# Patient Record
Sex: MALE | Race: WHITE | NOT HISPANIC OR LATINO | Employment: OTHER | URBAN - NONMETROPOLITAN AREA
[De-identification: names, ages, dates, MRNs, and addresses within clinical notes are randomized per-mention and may not be internally consistent; named-entity substitution may affect disease eponyms.]

---

## 2017-10-23 ENCOUNTER — OFFICE VISIT (OUTPATIENT)
Dept: MEDICAL GROUP | Facility: CLINIC | Age: 47
End: 2017-10-23
Payer: COMMERCIAL

## 2017-10-23 VITALS
RESPIRATION RATE: 16 BRPM | HEIGHT: 73 IN | TEMPERATURE: 99.7 F | HEART RATE: 88 BPM | DIASTOLIC BLOOD PRESSURE: 88 MMHG | SYSTOLIC BLOOD PRESSURE: 138 MMHG | BODY MASS INDEX: 27.67 KG/M2 | OXYGEN SATURATION: 97 % | WEIGHT: 208.8 LBS

## 2017-10-23 DIAGNOSIS — Z00.00 WELL ADULT EXAM: ICD-10-CM

## 2017-10-23 PROCEDURE — 99396 PREV VISIT EST AGE 40-64: CPT | Performed by: FAMILY MEDICINE

## 2017-10-23 ASSESSMENT — ENCOUNTER SYMPTOMS
HEADACHES: 0
MYALGIAS: 0
DIZZINESS: 0
CONSTIPATION: 0
HEMOPTYSIS: 0
MUSCULOSKELETAL NEGATIVE: 1
CONSTITUTIONAL NEGATIVE: 1
RESPIRATORY NEGATIVE: 1
GASTROINTESTINAL NEGATIVE: 1
PSYCHIATRIC NEGATIVE: 1
CARDIOVASCULAR NEGATIVE: 1
NEUROLOGICAL NEGATIVE: 1
PALPITATIONS: 0
FEVER: 0
COUGH: 0
CHILLS: 0
EYES NEGATIVE: 1
NECK PAIN: 0

## 2017-10-23 ASSESSMENT — PATIENT HEALTH QUESTIONNAIRE - PHQ9: CLINICAL INTERPRETATION OF PHQ2 SCORE: 0

## 2017-10-23 NOTE — PROGRESS NOTES
Subjective:      Yinka Laird is a 47 y.o. male who presents with [facility]            New patient visit, no acute problems, some loss of libido recently but nothing else    1. Well adult exam    - COMP METABOLIC PANEL; Future  - LIPID PROFILE; Future  - FREE THYROXINE; Future  - TRIIDOTHYRONINE; Future  - TESTOSTERONE SERUM; Future  - TSH; Future  - VITAMIN D,25 HYDROXY; Future  - CBC WITHOUT DIFFERENTIAL; Future    History reviewed. No pertinent past medical history.  History reviewed. No pertinent surgical history.  Smoking status: Former Smoker                                                              Packs/day: 0.00      Years: 0.00         Quit date: 3/23/2011  Smokeless tobacco: Former User                        Types: Chew  Alcohol use: Yes              Comment: occasionally    Review of patient's family history indicates:    Cancer                         Mother                      No current outpatient prescriptions on file.    Patient was instructed on the use of medications, either prescriptions or OTC and informed on when the appropriate follow up time period should be. In addition, patient was also instructed that should any acute worsening occur that they should notify this clinic asap or call 911.            Review of Systems   Constitutional: Negative.  Negative for chills and fever.        History reviewed. No pertinent past medical history.  History reviewed. No pertinent surgical history.  Smoking status: Former Smoker                                                              Packs/day: 0.00      Years: 0.00         Quit date: 3/23/2011  Smokeless tobacco: Former User                        Types: Chew  Alcohol use: Yes              Comment: occasionally    Review of patient's family history indicates:    Cancer                         Mother                     HENT: Negative.    Eyes: Negative.    Respiratory: Negative.  Negative for cough and hemoptysis.    Cardiovascular:  "Negative.  Negative for chest pain and palpitations.   Gastrointestinal: Negative.  Negative for constipation.   Genitourinary: Negative.  Negative for dysuria and urgency.   Musculoskeletal: Negative.  Negative for myalgias and neck pain.   Skin: Negative.  Negative for rash.   Neurological: Negative.  Negative for dizziness and headaches.   Endo/Heme/Allergies: Negative.    Psychiatric/Behavioral: Negative.  Negative for suicidal ideas.          Objective:     /88   Pulse 88   Temp 37.6 °C (99.7 °F)   Resp 16   Ht 1.854 m (6' 1\")   Wt 94.7 kg (208 lb 12.8 oz)   SpO2 97%   BMI 27.55 kg/m²      Physical Exam   Constitutional: He is oriented to person, place, and time. He appears well-developed and well-nourished. No distress.   HENT:   Head: Normocephalic and atraumatic.   Right Ear: External ear normal.   Left Ear: External ear normal.   Nose: Nose normal.   Mouth/Throat: Oropharynx is clear and moist. No oropharyngeal exudate.   Eyes: Pupils are equal, round, and reactive to light. Right eye exhibits no discharge. Left eye exhibits no discharge. No scleral icterus.   Neck: Normal range of motion. Neck supple. No JVD present. No tracheal deviation present. No thyromegaly present.   Cardiovascular: Normal rate, regular rhythm, normal heart sounds and intact distal pulses.  Exam reveals no gallop and no friction rub.    No murmur heard.  Pulmonary/Chest: Effort normal and breath sounds normal. No stridor. No respiratory distress. He has no wheezes. He has no rales. He exhibits no tenderness.   Abdominal: Soft. He exhibits no distension. There is no tenderness.   Lymphadenopathy:     He has no cervical adenopathy.   Neurological: He is alert and oriented to person, place, and time. No cranial nerve deficit.   Skin: He is not diaphoretic.   Psychiatric: He has a normal mood and affect. His behavior is normal. Judgment and thought content normal.   Nursing note and vitals reviewed.            "   Assessment/Plan:     1. Well adult exam    - COMP METABOLIC PANEL; Future  - LIPID PROFILE; Future  - FREE THYROXINE; Future  - TRIIDOTHYRONINE; Future  - TESTOSTERONE SERUM; Future  - TSH; Future  - VITAMIN D,25 HYDROXY; Future  - CBC WITHOUT DIFFERENTIAL; Future

## 2017-10-26 ENCOUNTER — NON-PROVIDER VISIT (OUTPATIENT)
Dept: MEDICAL GROUP | Facility: CLINIC | Age: 47
End: 2017-10-26
Payer: COMMERCIAL

## 2017-10-26 ENCOUNTER — HOSPITAL ENCOUNTER (OUTPATIENT)
Facility: MEDICAL CENTER | Age: 47
End: 2017-10-26
Attending: FAMILY MEDICINE
Payer: COMMERCIAL

## 2017-10-26 DIAGNOSIS — Z01.89 ROUTINE LAB DRAW: ICD-10-CM

## 2017-10-26 LAB
25(OH)D3 SERPL-MCNC: 21 NG/ML
CHOLEST SERPL-MCNC: 308 MG/DL
CREAT SERPL-MCNC: 0.81 MG/DL
HDLC SERPL-MCNC: 46 MG/DL
LDLC SERPL CALC-MCNC: 199 MG/DL
TRIGL SERPL-MCNC: 371 MG/DL

## 2017-10-26 PROCEDURE — 99000 SPECIMEN HANDLING OFFICE-LAB: CPT | Performed by: PHYSICIAN ASSISTANT

## 2017-10-26 PROCEDURE — 36415 COLL VENOUS BLD VENIPUNCTURE: CPT | Performed by: PHYSICIAN ASSISTANT

## 2017-12-05 ENCOUNTER — TELEPHONE (OUTPATIENT)
Dept: MEDICAL GROUP | Facility: CLINIC | Age: 47
End: 2017-12-05

## 2017-12-05 NOTE — TELEPHONE ENCOUNTER
Pt called and wanted to know his results of his labs. I looked in chart and there is no note stating his results. Please advise

## 2017-12-15 ENCOUNTER — OFFICE VISIT (OUTPATIENT)
Dept: MEDICAL GROUP | Facility: CLINIC | Age: 47
End: 2017-12-15
Payer: COMMERCIAL

## 2017-12-15 VITALS
SYSTOLIC BLOOD PRESSURE: 132 MMHG | DIASTOLIC BLOOD PRESSURE: 84 MMHG | HEIGHT: 73 IN | OXYGEN SATURATION: 98 % | HEART RATE: 76 BPM | TEMPERATURE: 99.5 F | WEIGHT: 208 LBS | BODY MASS INDEX: 27.57 KG/M2

## 2017-12-15 DIAGNOSIS — E78.2 MIXED HYPERLIPIDEMIA: ICD-10-CM

## 2017-12-15 DIAGNOSIS — E55.9 VITAMIN D DEFICIENCY: ICD-10-CM

## 2017-12-15 DIAGNOSIS — E11.9 CONTROLLED TYPE 2 DIABETES MELLITUS WITHOUT COMPLICATION, WITHOUT LONG-TERM CURRENT USE OF INSULIN (HCC): ICD-10-CM

## 2017-12-15 PROCEDURE — 99214 OFFICE O/P EST MOD 30 MIN: CPT | Performed by: FAMILY MEDICINE

## 2017-12-15 ASSESSMENT — ENCOUNTER SYMPTOMS
CHILLS: 0
FEVER: 0
MYALGIAS: 0
COUGH: 0
MUSCULOSKELETAL NEGATIVE: 1
NEUROLOGICAL NEGATIVE: 1
DIZZINESS: 0
EYES NEGATIVE: 1
NECK PAIN: 0
CONSTIPATION: 0
GASTROINTESTINAL NEGATIVE: 1
PSYCHIATRIC NEGATIVE: 1
HEADACHES: 0
CONSTITUTIONAL NEGATIVE: 1
RESPIRATORY NEGATIVE: 1
CARDIOVASCULAR NEGATIVE: 1
HEMOPTYSIS: 0
PALPITATIONS: 0

## 2017-12-15 NOTE — PROGRESS NOTES
Subjective:      Yinka Laird is a 47 y.o. male who presents with Diabetes Mellitus            1. Vitamin D deficiency  Low on labs will replace  - cholecalciferol ([employer] VIT D 5000 HIGH-POTENCY) 5000 UNIT Cap; Take 1 Cap by mouth every day.  Dispense: 30 Cap; Refill: 3  - COMP METABOLIC PANEL; Future  - HEMOGLOBIN A1C; Future  - LIPID PROFILE; Future    2. Controlled type 2 diabetes mellitus without complication, without long-term current use of insulin (CMS-[facility])  nfbs of 299  Likely diabetic  Patient reluctant to take meds wants to do trial of strict diet  Offered him a one month trial of this and then doing another set of labs in 2 mo  Agrees to this and if not optimal in 2  Months will need meds  - cholecalciferol ([employer]S VIT D 5000 HIGH-POTENCY) 5000 UNIT Cap; Take 1 Cap by mouth every day.  Dispense: 30 Cap; Refill: 3  - COMP METABOLIC PANEL; Future  - HEMOGLOBIN A1C; Future  - LIPID PROFILE; Future    3. Mixed hyperlipidemia  ldl of 199    Patient reluctant to take meds wants to do trial of strict diet  Offered him a one month trial of this and then doing another set of labs in 2 mo  Agrees to this and if not optimal in 2  Months will need meds    - cholecalciferol (CVS VIT D 5000 HIGH-POTENCY) 5000 UNIT Cap; Take 1 Cap by mouth every day.  Dispense: 30 Cap; Refill: 3  - COMP METABOLIC PANEL; Future  - HEMOGLOBIN A1C; Future  - LIPID PROFILE; Future    Past Medical History:  12/15/2017: Mixed hyperlipidemia  No past surgical history on file.  Smoking status: Former Smoker                                                              Packs/day: 0.00      Years: 0.00         Quit date: 3/23/2011  Smokeless tobacco: Former User                        Types: Chew  Alcohol use: Yes              Comment: occasionally    Review of patient's family history indicates:    Cancer                         Mother                      Current Outpatient Prescriptions: •  cholecalciferol (CVS VIT D 5000 HIGH-POTENCY) 5000 UNIT  "Cap, Take 1 Cap by mouth every day., Disp: 30 Cap, Rfl: 3    Patient was instructed on the use of medications, either prescriptions or OTC and informed on when the appropriate follow up time period should be. In addition, patient was also instructed that should any acute worsening occur that they should notify this clinic asap or call 911.            Review of Systems   Constitutional: Negative.  Negative for chills and fever.        Past Medical History:  12/15/2017: Mixed hyperlipidemia  No past surgical history on file.  Smoking status: Former Smoker                                                              Packs/day: 0.00      Years: 0.00         Quit date: 3/23/2011  Smokeless tobacco: Former User                        Types: Chew  Alcohol use: Yes              Comment: occasionally    Review of patient's family history indicates:    Cancer                         Mother                     HENT: Negative.    Eyes: Negative.    Respiratory: Negative.  Negative for cough and hemoptysis.    Cardiovascular: Negative.  Negative for chest pain and palpitations.   Gastrointestinal: Negative.  Negative for constipation.   Genitourinary: Negative.  Negative for dysuria and urgency.   Musculoskeletal: Negative.  Negative for myalgias and neck pain.   Skin: Negative.  Negative for rash.   Neurological: Negative.  Negative for dizziness and headaches.   Endo/Heme/Allergies: Negative.    Psychiatric/Behavioral: Negative.  Negative for suicidal ideas.          Objective:     /84   Pulse 76   Temp 37.5 °C (99.5 °F)   Ht 1.854 m (6' 1\")   Wt 94.3 kg (208 lb)   SpO2 98%   BMI 27.44 kg/m²      Physical Exam   Constitutional: He is oriented to person, place, and time. He appears well-developed and well-nourished. No distress.   HENT:   Head: Normocephalic and atraumatic.   Mouth/Throat: Oropharynx is clear and moist. No oropharyngeal exudate.   Eyes: Pupils are equal, round, and reactive to light. "   Cardiovascular: Normal rate, regular rhythm, normal heart sounds and intact distal pulses.  Exam reveals no gallop and no friction rub.    No murmur heard.  Pulmonary/Chest: Effort normal and breath sounds normal. No respiratory distress. He has no wheezes. He has no rales. He exhibits no tenderness.   Neurological: He is alert and oriented to person, place, and time.   Skin: He is not diaphoretic.   Psychiatric: He has a normal mood and affect. His behavior is normal. Judgment and thought content normal.   Nursing note and vitals reviewed.              Assessment/Plan:     1. Vitamin D deficiency    - cholecalciferol (CVS VIT D 5000 HIGH-POTENCY) 5000 UNIT Cap; Take 1 Cap by mouth every day.  Dispense: 30 Cap; Refill: 3  - COMP METABOLIC PANEL; Future  - HEMOGLOBIN A1C; Future  - LIPID PROFILE; Future    2. Controlled type 2 diabetes mellitus without complication, without long-term current use of insulin (CMS-HCC)    - cholecalciferol (CVS VIT D 5000 HIGH-POTENCY) 5000 UNIT Cap; Take 1 Cap by mouth every day.  Dispense: 30 Cap; Refill: 3  - COMP METABOLIC PANEL; Future  - HEMOGLOBIN A1C; Future  - LIPID PROFILE; Future    3. Mixed hyperlipidemia    - cholecalciferol (CVS VIT D 5000 HIGH-POTENCY) 5000 UNIT Cap; Take 1 Cap by mouth every day.  Dispense: 30 Cap; Refill: 3  - COMP METABOLIC PANEL; Future  - HEMOGLOBIN A1C; Future  - LIPID PROFILE; Future

## 2018-02-28 ENCOUNTER — NON-PROVIDER VISIT (OUTPATIENT)
Dept: MEDICAL GROUP | Facility: CLINIC | Age: 48
End: 2018-02-28
Payer: COMMERCIAL

## 2018-02-28 DIAGNOSIS — Z01.89 ROUTINE LAB DRAW: ICD-10-CM

## 2018-02-28 LAB — HBA1C MFR BLD: 7.4 % (ref ?–5.8)

## 2018-02-28 PROCEDURE — 36415 COLL VENOUS BLD VENIPUNCTURE: CPT | Performed by: PHYSICIAN ASSISTANT

## 2018-02-28 PROCEDURE — 99000 SPECIMEN HANDLING OFFICE-LAB: CPT | Performed by: PHYSICIAN ASSISTANT

## 2018-03-08 ENCOUNTER — OFFICE VISIT (OUTPATIENT)
Dept: MEDICAL GROUP | Facility: CLINIC | Age: 48
End: 2018-03-08
Payer: COMMERCIAL

## 2018-03-08 VITALS
DIASTOLIC BLOOD PRESSURE: 76 MMHG | BODY MASS INDEX: 26.64 KG/M2 | RESPIRATION RATE: 16 BRPM | OXYGEN SATURATION: 97 % | HEART RATE: 87 BPM | SYSTOLIC BLOOD PRESSURE: 124 MMHG | HEIGHT: 73 IN | TEMPERATURE: 99.5 F | WEIGHT: 201 LBS

## 2018-03-08 DIAGNOSIS — E78.2 MIXED HYPERLIPIDEMIA: ICD-10-CM

## 2018-03-08 DIAGNOSIS — Z13.6 SCREENING FOR CARDIOVASCULAR CONDITION: ICD-10-CM

## 2018-03-08 DIAGNOSIS — M79.674 TOE PAIN, RIGHT: ICD-10-CM

## 2018-03-08 DIAGNOSIS — E11.9 CONTROLLED TYPE 2 DIABETES MELLITUS WITHOUT COMPLICATION, WITHOUT LONG-TERM CURRENT USE OF INSULIN (HCC): ICD-10-CM

## 2018-03-08 PROCEDURE — 99214 OFFICE O/P EST MOD 30 MIN: CPT | Performed by: PHYSICIAN ASSISTANT

## 2018-03-08 RX ORDER — LISINOPRIL 2.5 MG/1
2.5 TABLET ORAL DAILY
Qty: 30 TAB | Refills: 1 | Status: SHIPPED | OUTPATIENT
Start: 2018-03-08 | End: 2018-06-01 | Stop reason: SDUPTHER

## 2018-03-08 RX ORDER — PRAVASTATIN SODIUM 20 MG
20 TABLET ORAL
Qty: 30 TAB | Refills: 11 | Status: SHIPPED | OUTPATIENT
Start: 2018-03-08 | End: 2018-06-07 | Stop reason: SDUPTHER

## 2018-03-08 NOTE — ASSESSMENT & PLAN NOTE
Last A1c: 7.4% Early March, 2018   DM Medications: none at this time.   HTN: Blood pressure goal <140/<90 Yes  ACE: none. Will start before next visit.   Hyperlipidemia: Cholesterol goal LDL <100 No.   Currently Rx Statin: will start pravastatin. Diabetic diet: No, referred to nutrition therapy.   Exercise: occasional  Last monofilament foot exam: none will complete at next visit.  Checks feet at home: No, no sores currently   Last Eye exam: none recently, will check at next visit.   Kidney function: GFR > 60 march 2018  Microalbumin screening: wnl march 2018  Has patient received flu vaccine: No  Has patient received Hep B series:No    A1c goal <7 No  Current barriers to control include medication   Glucose monitoring frequency: none at this time.  Diabetic complications: none    The patient is not taking ASA every day and will begin taking all other medications as prescribed.

## 2018-03-08 NOTE — ASSESSMENT & PLAN NOTE
For many years, the patient states he has had a painful lump on the bottom of his right foot underneath his fourth toe. He states that he was told once that it was a tendon problem but nothing was ever done about it. He states that when he wears narrow shoes it becomes very bothersome. He denies radiating symptoms, swelling or redness.

## 2018-03-08 NOTE — ASSESSMENT & PLAN NOTE
This problem was recently discovered. Patient attempted to control this with diet alone and was teresa to reduce his triglycerides from 371 in Oct 2017 to 159 in March 2018. His LDL has reduced from 199 to 183 in this same time. HDL has remained essentially the same.   The patient denies chest pain, shortness of breath or blurry vision at this time.   Labs in media.

## 2018-03-08 NOTE — PROGRESS NOTES
Chief Complaint   Patient presents with   • Hyperlipidemia   • Elevated Glucose       HISTORY OF PRESENT ILLNESS: Patient is a 47 y.o. male established patient who presents today for evaluation and management of:    Controlled type 2 diabetes mellitus without complication, without long-term current use of insulin (CMS-[facility])  Last A1c: 7.4% Early March, 2018   DM Medications: none at this time.   HTN: Blood pressure goal <140/<90 Yes  ACE: none. Will start before next visit.   Hyperlipidemia: Cholesterol goal LDL <100 No.   Currently Rx Statin: will start pravastatin. Diabetic diet: No, referred to nutrition therapy.   Exercise: occasional  Last monofilament foot exam: none will complete at next visit.  Checks feet at home: No, no sores currently   Last Eye exam: none recently, will check at next visit.   Kidney function: GFR > 60 march 2018  Microalbumin screening: wnl march 2018  Has patient received flu vaccine: No  Has patient received Hep B series:No    A1c goal <7 No  Current barriers to control include medication   Glucose monitoring frequency: none at this time.  Diabetic complications: none    The patient is not taking ASA every day and will begin taking all other medications as prescribed.      Mixed hyperlipidemia  This problem was recently discovered. Patient attempted to control this with diet alone and was teresa to reduce his triglycerides from 371 in Oct 2017 to 159 in March 2018. His LDL has reduced from 199 to 183 in this same time. HDL has remained essentially the same.   The patient denies chest pain, shortness of breath or blurry vision at this time.   Labs in media.       Toe pain, right  For many years, the patient states he has had a painful lump on the bottom of his right foot underneath his fourth toe. He states that he was told once that it was a tendon problem but nothing was ever done about it. He states that when he wears narrow shoes it becomes very bothersome. He denies radiating  symptoms, swelling or redness.        Patient Active Problem List    Diagnosis Date Noted   • Toe pain, right 2018   • Vitamin D deficiency 12/15/2017   • Controlled type 2 diabetes mellitus without complication, without long-term current use of insulin (CMS-[facility]) 12/15/2017   • Mixed hyperlipidemia 12/15/2017       Allergies:Patient has no known allergies.    Current Outpatient Prescriptions   Medication Sig Dispense Refill   • metFORMIN (GLUCOPHAGE) 500 MG Tab Take 1 Tab by mouth every morning with breakfast. 30 Tab 2   • pravastatin (PRAVACHOL) 20 MG Tab Take 1 Tab by mouth every bedtime. 30 Tab 11   • lisinopril (PRINIVIL) 2.5 MG Tab Take 1 Tab by mouth every day. 30 Tab 1   • cholecalciferol ([employer]S VIT D 5000 HIGH-POTENCY) 5000 UNIT Cap Take 1 Cap by mouth every day. 30 Cap 3     No current facility-administered medications for this visit.        Social History   Substance Use Topics   • Smoking status: Former Smoker     Types: Cigarettes     Quit date: 3/23/2011   • Smokeless tobacco: Former User     Types: Chew   • Alcohol use Yes      Comment: occasionally       Family Status   Relation Status   • Mother    • Father Alive     Family History   Problem Relation Age of Onset   • Breast Cancer Mother        Review of Systems:   Constitutional: Negative for fever, chills, weight loss and malaise/fatigue.   HENT: Negative for ear pain, nosebleeds, congestion, sore throat and neck pain.    Eyes: Negative for blurred vision.   Respiratory: Negative for cough, sputum production, shortness of breath and wheezing.    Cardiovascular: Negative for chest pain, palpitations, orthopnea and leg swelling.   Gastrointestinal: Negative for heartburn, nausea, vomiting and abdominal pain.   Genitourinary: Negative for dysuria, urgency and frequency.   Musculoskeletal: Negative for myalgias, back pain.   Skin: Negative for rash and itching.   Neurological: Negative for dizziness, tingling, tremors, sensory change, focal  "weakness and headaches.   Endo/Heme/Allergies: Does not bruise/bleed easily.   Psychiatric/Behavioral: Negative for depression, suicidal ideas and memory loss.  The patient is not nervous/anxious and does not have insomnia.      Exam:  Blood pressure 124/76, pulse 87, temperature 37.5 °C (99.5 °F), resp. rate 16, height 1.854 m (6' 1\"), weight 91.2 kg (201 lb), SpO2 97 %.  Body mass index is 26.52 kg/m².  General:  Healthy-Appearing male in NAD  Head: is grossly normal.  Neck: Supple without masses. Thyroid is not visibly enlarged.  Pulmonary: Clear to ausculation. Normal effort. No rales, ronchi, or wheezing.  Cardiovascular: Regular rate and rhythm without murmur. Carotid and radial pulses are intact and equal bilaterally.  Extremities: no clubbing, cyanosis, or edema.  Musculoskeletal: tenderness to deep palpation of the dorsal side of the mtp joint of the right 4th toe. No erythema or edema present at this location. Gait is wnl. ROM wnl for all joints including the involved toe.     Medical decision-making and discussion:  1. Controlled type 2 diabetes mellitus without complication, without long-term current use of insulin (CMS-HCC)  Over 30 minutes was spent in discussion of this patient's new diabetes diagnosis. Diet modifications, diabetes.org, medications, and pathology were discussed.   - metFORMIN (GLUCOPHAGE) 500 MG Tab; Take 1 Tab by mouth every morning with breakfast.  Dispense: 30 Tab; Refill: 2  Start the following medication about 4 weeks after starting metformin:  - lisinopril (PRINIVIL) 2.5 MG Tab; Take 1 Tab by mouth every day.  Dispense: 30 Tab; Refill: 1  - HEMOGLOBIN A1C  - CMP (12)  - REFERRAL TO NUTRITION SERVICES    2. Mixed hyperlipidemia  Start the following medication 2 weeks after starting metformin.   - pravastatin (PRAVACHOL) 20 MG Tab; Take 1 Tab by mouth every bedtime.  Dispense: 30 Tab; Refill: 11    3. Screening for cardiovascular condition  - LIPID PANEL    4. Toe pain, right  - " DX-FOOT-2- RIGHT; Future      Please note that this dictation was created using voice recognition software. I have made every reasonable attempt to correct obvious errors, but I expect that there are errors of grammar and possibly content that I did not discover before finalizing the note.      Return in about 6 weeks (around 4/19/2018) for DM, HLD.

## 2018-04-17 ENCOUNTER — NON-PROVIDER VISIT (OUTPATIENT)
Dept: HEALTH INFORMATION MANAGEMENT | Facility: MEDICAL CENTER | Age: 48
End: 2018-04-17
Payer: COMMERCIAL

## 2018-04-17 DIAGNOSIS — E11.9 CONTROLLED TYPE 2 DIABETES MELLITUS WITHOUT COMPLICATION, WITHOUT LONG-TERM CURRENT USE OF INSULIN (HCC): ICD-10-CM

## 2018-04-17 PROCEDURE — 98961 EDU&TRN PT SLF-MGMT NQHP 2-4: CPT | Performed by: INTERNAL MEDICINE

## 2018-04-17 NOTE — NON-PROVIDER
Yinka presents for Type II Diabetes class with new onset diabetes, HbA1c= 7.4%.  He started metformin 500 mg once daily without side effects.  Discussed basic physiology of diabetes, nutrition, exercise. oral medications, brief discussion of insulin, illness/injury, hormones, FSBG testing, preventing and treating hypoglycemia, preventing complications.  Supplied Contour NEXT glucometer and instructed in use.  He will need a prescription for Contour NEXT test strips.  He is motivated to change his lifestyle.    Define Type 2 diabetes: Education taught  Define Type 1 diabetes: Education taught  Understand feaures and benefits of education and management: Education taught  Describe who is responsible for diabetes management: Education taught      Diabetes Lifestyle Changes / Goals  State benefits of making appropriate lifestyle changes: Education taught  Identify lifestyle behaviors participant wants to change: Education taught  Identify risk factors that interfere with health and strategies to reduce : Education taught  Verbalize need for and frequency of health care follow-up: Education taught  Develop behavioral objectives and expected health outcomes: Education taught    Diabetes Exercise and Activity  Describe role of exercise in diabetes management: Education taught  State relationship of exercise to blood sugar: Education taught  State the benefits/risk(s) of exercise and precautions to follow: Education taught    Diabetes Self Blood Glucose Monitoring  Discuss rationale and importance of SBGM: Education taught  Discuss appropriate record keeping: Education taught  Discuss how to use results from blood glucose testing: Education taught  Evaluation and interpretation of blood glucose patterns: Education taught    Diabetes Disease Process  Discuss signs, symptoms, TX and prevention of hyperglycemia: Education taught  Discuss beta cell dysfunction and insulin resistance: Education taught  Discuss Insulin and its  "role in the body: Education taught  Discuss the role of the liver in glucose metabolism: Education taught  Discuss hormonal regulation: Education taught  Define benefits of good control and discuss what it means to be in \"good control\": Education taught  Discuss impact of exercise, food, meds, stress, and special factors on diabetes: Education taught  Discuss when to confer with HCP for possible treatment plan adjustments: Education taught    Diabetes Insulin and Medications  Action of oral medications, onset and duration: Education taught  Discuss incretin secretagogs and their use in diabetes management: Education taught    Hypoglycemia  List signs, symptoms and causes of hypoglycemia: Education taught  Discuss physiology of hypoglycemic reactions: Education taught  Accurately describe appropriate treatment and prevention: Education taught  Discuss when to contact HCP: Education taught    Sick Day Care  Verbalize important items to monitor when sick and when to contact HCP: Education taught    Complications (Chronic)  Explain prevention, TX, signs/symptoms of: retinopathy, neuropathy, nephropathy, infections: Education taught  Explain prevention, TX, signs/symptoms of: CAD, cerebral-vascular disease and sexual dysfunction: Education taught  Identify when to notify HCP of complications: Education taught  State principles of skin, dental and foot care.  Discuss proper foot care, prevention of foot probelms when to notify HCP>: Education taught  Demonstrate how to examine feet and what to look for: Education taught    Psychosocial adjustment  Identify sources of stress: Education taught  Discuss health care referral network / community resources for support: Education taught           "

## 2018-04-17 NOTE — LETTER
April 17, 2018      PERLA Abrams-C.  [address]  [place] [address]          RE: Yinka Laird  [birth year] [id number]    Dear:Fatmata Godinez P.A.-*    The above referenced patient received 3 hours of diabetes education from [facility].    Topics taught (may include but not limited to):  Introduction to diabetes, benefits and responsibilities of patient, physiology of diabetes and the diease process, benefits of blood glucose monitoring and record keeping, medication action and possible side effects, hypoglycemia, sick day management, exercise, stress reduction and travel with diabetes.     Provided meter and instructed in use: yes. Pt using [employer] Contour NEXT meter.    Dilated eye exam within the past year: no Goal is for patient to have yearly.   Lipid panel:   Lab Results   Component Value Date/Time    CHOLSTRLTOT 308 10/26/2017     10/26/2017    HDL 46 10/26/2017    TRIGLYCERIDE 371 10/26/2017    Date:10/26/17 Chol./HDL ratio 6.7  Goal is less than 5   Micro-albumin/Creat. Ratio: not available  Goal is less than 20 ng/dl  HbA1c:   Lab Results   Component Value Date/Time    HBA1C 7.4 02/28/2018    Goal is <7% in the next 3 months.  Vitamin D level: 21  Date: 10/26/17 Goal is greater than 30 ng/ml.    Daily aspirin use if >30 years old w/o contradictions:no     Patient/caregiver appeared to understand the content as demonstrated by appropriate questions.       Notes:Yinka presents for Type II Diabetes class with new onset diabetes, HbA1c= 7.4%.  He started metformin 500 mg once daily without side effects.  Discussed basic physiology of diabetes, nutrition, exercise. oral medications, brief discussion of insulin, illness/injury, hormones, FSBG testing, preventing and treating hypoglycemia, preventing complications.  Supplied Contour NEXT glucometer and instructed in use.  He will need a prescription for Contour NEXT test strips.  He is motivated to change  his lifestyle.    The patient was provided with written materials to back-up verbal education.   Thank you for this consultation,     Bisi Damon R.N.  Certified Diabetes Nurse Educator

## 2018-04-27 ENCOUNTER — APPOINTMENT (OUTPATIENT)
Dept: HEALTH INFORMATION MANAGEMENT | Facility: MEDICAL CENTER | Age: 48
End: 2018-04-27
Payer: COMMERCIAL

## 2018-05-04 ENCOUNTER — NON-PROVIDER VISIT (OUTPATIENT)
Dept: HEALTH INFORMATION MANAGEMENT | Facility: MEDICAL CENTER | Age: 48
End: 2018-05-04
Payer: COMMERCIAL

## 2018-05-04 VITALS — WEIGHT: 201 LBS | HEIGHT: 73 IN | BODY MASS INDEX: 26.64 KG/M2

## 2018-05-04 DIAGNOSIS — E11.9 TYPE 2 DIABETES MELLITUS WITHOUT COMPLICATION, WITHOUT LONG-TERM CURRENT USE OF INSULIN (HCC): ICD-10-CM

## 2018-05-04 PROCEDURE — 98960 EDU&TRN PT SELF-MGMT NQHP 1: CPT | Performed by: DIETITIAN, REGISTERED

## 2018-05-04 NOTE — LETTER
May 4, 2018      Fatmata Godinez P.A.-C.  [address] [address], [address] [address]                   Dear:Fatmata Godinez P.A.-*      Your patient Yinka Laird 1970 was recently seen at [facility] for nutrition counseling, regarding type 2 diabetes.      Should you desire any notes from this visit please do not hesitate to give us a call at [phone].      Sincerely,  Tri Graf RD, CDE  Certified Diabetes Educator

## 2018-05-04 NOTE — PROGRESS NOTES
"5/4/2018    Fatmata STREETER ESTELLE Godinez  47 y.o.   Time in/out: 9:00/11:00    Anthropometrics/Objective  Vitals:    05/04/18 1134   Weight: 91.2 kg (201 lb)   Height: 1.854 m (6' 1\")       Body mass index is 26.52 kg/m².    Stated Goal Weight: 195 lb  Estimated Caloric needs 2300  Kcal   See comprehensive patient history form for further information     Subjective:  I have stopped drinking sodas, eating fried foods and corn dogs.  I want to get my blood sugars down so that I don't need to take medication.  I'm not checking my blood sugars yet.  I don't exercise, but I am very busy at work.    Nutrition Diagnosis (PES Statement)  Problem (Nutrition diagnosis)  Clinical: Altered nutrition-related lab values, Clinical: Overweight, Behavioral/Environmental: Food/nutrition knowledge deficit and Behavioral/Environmental: Inadequate physical activity    Etiology(Addresses the cause,contributing factors)  Food and nutrition related knowledge deficit, Excessive energy intake, Inadequate physical activity/exercise and No previous DM nutrition education    Signs/Symptoms (Address observations and stated info: subjective and objective data)  Infrequent, low-duration and /or low-intensity physical activity and Limited knowledge of CHO/PRO/FAT compostition of food and /or CHO/PRO/FAT metabolism  · BMI 26.5  · Weight loss/gain:  No    Client history:  Condition(s) associated with a diagnosis or treatment (specify)     Biochemical data, medical test and procedures  Lab Results   Component Value Date/Time    HBA1C 7.4 02/28/2018   @  No results found for: POCGLUCOSE  Lab Results   Component Value Date/Time    CHOLSTRLTOT 308 10/26/2017     10/26/2017    HDL 46 10/26/2017    TRIGLYCERIDE 371 10/26/2017         Nutrition Intervention  Nutrition Prescription  Recommended Daily Kcals 2000  Carb choices: 16  Protein: 8 oz/day  Fat grams: 65    Meal and Snack  Recommend a general/healthful diet, 3 meals and 1-2 " snacks.    Comprehensive Nutrition education Instruction or training leading to in-depth nutrition related knowledge about:  Benefits to following meal plan, Combine carb, protein and fat at each meal, Eating out, Fast food, Meal timing and spacing, Menu Planning, Metabolism of carb, protein, fat, Physical activity/exercise, Portion control, Sweets and alcohol in moderation, Heart-healthy guidelines, Increasing/Decreasing PO intake, Label Reading and Handouts provided regarding topics discussed    Monitoring & Evaluation Plan    Behavioral-Environmental:  Behavior: Yinka states he wants to know all about nutrition and portions because he wants to get his blood sugars under control.  However, he isn't even checking his blood glucose.  He has a meter but hasn't used it.  He normally eats at [employer] or other fast food places.      Food / Nutrient Intake:  Food intake Yinka states he's cut out all sodas, fried foods and corn dogs.    Physical Signs / Symptoms:  HbA1c profiles 7.4 on 3/2/18 with new diagnosis of diabetes.    Assessment Notes:  Yinka was encouraged to test blood glucose once a day at different times to see how he is doing.  He was also encouraged to take lunch and a couple of snacks to work with him in a cooler.  He wants to do meal prep, but hasn't really done much to get it going.  He does know how to cook, but since his son grew up, he doesn't like cooking for himself.  He was encouraged to use [email or web address] when eating at fast food establishments.  I believe he has the knowledge to eat more healthfully, he just has to figure out how to make it happen.  He was encouraged to start slowly.

## 2018-06-04 RX ORDER — METFORMIN HYDROCHLORIDE 500 MG/1
500 TABLET, EXTENDED RELEASE ORAL
Qty: 30 TAB | Refills: 1 | Status: SHIPPED | OUTPATIENT
Start: 2018-06-04 | End: 2018-06-07 | Stop reason: SDUPTHER

## 2018-06-07 DIAGNOSIS — E78.2 MIXED HYPERLIPIDEMIA: ICD-10-CM

## 2018-06-07 DIAGNOSIS — E11.9 CONTROLLED TYPE 2 DIABETES MELLITUS WITHOUT COMPLICATION, WITHOUT LONG-TERM CURRENT USE OF INSULIN (HCC): ICD-10-CM

## 2018-06-07 RX ORDER — METFORMIN HYDROCHLORIDE 500 MG/1
500 TABLET, EXTENDED RELEASE ORAL
Qty: 30 TAB | Refills: 1 | Status: SHIPPED | OUTPATIENT
Start: 2018-06-07 | End: 2018-08-06 | Stop reason: SDUPTHER

## 2018-06-07 RX ORDER — PRAVASTATIN SODIUM 40 MG
40 TABLET ORAL
Qty: 30 TAB | Refills: 0 | Status: SHIPPED | OUTPATIENT
Start: 2018-06-07 | End: 2018-06-07 | Stop reason: SDUPTHER

## 2018-06-07 RX ORDER — PRAVASTATIN SODIUM 40 MG
40 TABLET ORAL
Qty: 30 TAB | Refills: 0 | Status: SHIPPED | OUTPATIENT
Start: 2018-06-07 | End: 2019-04-03

## 2018-06-07 RX ORDER — LISINOPRIL 2.5 MG/1
2.5 TABLET ORAL DAILY
Qty: 30 TAB | Refills: 1 | Status: SHIPPED | OUTPATIENT
Start: 2018-06-07 | End: 2018-10-03 | Stop reason: SDUPTHER

## 2018-06-07 NOTE — TELEPHONE ENCOUNTER
Needs Metforman HCL 500mg   Provostatin,  Lisinopril 2.5 mg   Sent to the [facility] pharmacy on [address].   He states they have not been sent to right place or with the correct dossage

## 2018-06-11 DIAGNOSIS — E11.9 CONTROLLED TYPE 2 DIABETES MELLITUS WITHOUT COMPLICATION, WITHOUT LONG-TERM CURRENT USE OF INSULIN (HCC): ICD-10-CM

## 2018-06-11 NOTE — TELEPHONE ENCOUNTER
Phone Number Called: [phone] (home)     Message: Pt. Informed.    Left Message for patient to call back: N\A

## 2018-06-11 NOTE — TELEPHONE ENCOUNTER
I am switching most or all of my patient to the extended reease versoin of metformin. Please inform the patient. Thank you.

## 2018-06-11 NOTE — TELEPHONE ENCOUNTER
Phone Number Called: [phone] (home)     Message: Pt. Called and would like clarification as to why his Medication was changed to extended release. He stated he has not had any labs and was never informed that his medication was going to be changing.    Left Message for patient to call back: N\A

## 2018-07-27 ENCOUNTER — PATIENT OUTREACH (OUTPATIENT)
Dept: HEALTH INFORMATION MANAGEMENT | Facility: OTHER | Age: 48
End: 2018-07-27

## 2018-07-27 NOTE — PROGRESS NOTES
Outcome: Left Message    Please transfer to Patient Outreach Team at [phone] when patient returns call.    WebIZ Checked & Epic Updated:  yes    [facility] Verified: yes    Attempt # 1

## 2018-08-30 NOTE — PROGRESS NOTES
Outcome: Left Message    Please transfer to Patient Outreach Team at [phone] when patient returns call.    Attempt # 2

## 2018-09-12 NOTE — PROGRESS NOTES
Outcome: Left Message    Please transfer to Patient Outreach Team at [phone] when patient returns call.    Attempt # 3

## 2018-09-20 NOTE — PROGRESS NOTES
Outcome: Pt requested a call back    Please transfer to Patient Outreach Team at [phone] when patient returns call.    Attempt # 6

## 2018-09-20 NOTE — PROGRESS NOTES
Outcome: Left Message    Please transfer to Patient Outreach Team at [phone] when patient returns call.    Attempt # 5

## 2018-10-03 DIAGNOSIS — E11.9 CONTROLLED TYPE 2 DIABETES MELLITUS WITHOUT COMPLICATION, WITHOUT LONG-TERM CURRENT USE OF INSULIN (HCC): ICD-10-CM

## 2018-10-03 RX ORDER — LISINOPRIL 2.5 MG/1
TABLET ORAL
Qty: 30 TAB | Refills: 0 | Status: SHIPPED | OUTPATIENT
Start: 2018-10-03 | End: 2018-11-10 | Stop reason: SDUPTHER

## 2019-01-07 RX ORDER — METFORMIN HYDROCHLORIDE 500 MG/1
TABLET, EXTENDED RELEASE ORAL
Qty: 15 TAB | Refills: 0 | OUTPATIENT
Start: 2019-01-07

## 2019-01-10 RX ORDER — METFORMIN HYDROCHLORIDE 500 MG/1
TABLET, EXTENDED RELEASE ORAL
Refills: 0 | OUTPATIENT
Start: 2019-01-10

## 2019-01-14 ENCOUNTER — TELEPHONE (OUTPATIENT)
Dept: MEDICAL GROUP | Facility: CLINIC | Age: 49
End: 2019-01-14

## 2019-01-14 NOTE — TELEPHONE ENCOUNTER
VM received 1/10/19 @ 1:48pm    Message from patient questioning why the office didn't call and tell him that he needed to come in for an appointment when he tried to fill his Metformin.  Pt didn't understand why office only gave RX refill of two weeks.     Pt still has orders for fasting labs, confirmed with provider she would like him to complete these labs prior to coming into office for visit.  Per pt's exam notes in March 2018 the patient was to have returned to the clinic for a 6 wk follow up but the patient has still yet to be seen.  This is why the provider last filled his Metformin for a two week supply.    Unable to leave message for pt as phone is disconnected. Norma Franz, Med Ass't

## 2019-01-14 NOTE — LETTER
January 14, 2019        Yinka Laird  [address] [place] [address]        Dear Yinka:    The office attempted to return your phone call from January 10, 2019 on Monday January 14, 2019 but we were unable to leave you a message at that time.    Our records show that the provider had asked that you return to the office in April 2018 but it appears that appointment was cancelled but not rescheduled.  It is imperative that we see you to follow up on your Diabetes as we are unable to continue filling your diabetic medications without proper follow up which includes blood work.      Please call our scheduling department at [phone] to schedule an appointment with OUMAR Morales at [facility] at your soonest convenience for a Diabetic Follow up exam.  The provider would like you to complete fasting blood work 5-7 days prior to coming in for your appointment.      If you have any questions or concerns, please don't hesitate to call.        Sincerely,        Fatmata Godinez P.A.-C.    Electronically Signed

## 2019-01-16 ENCOUNTER — TELEPHONE (OUTPATIENT)
Dept: MEDICAL GROUP | Facility: CLINIC | Age: 49
End: 2019-01-16

## 2019-01-16 DIAGNOSIS — E11.9 CONTROLLED TYPE 2 DIABETES MELLITUS WITHOUT COMPLICATION, WITHOUT LONG-TERM CURRENT USE OF INSULIN (HCC): ICD-10-CM

## 2019-01-16 RX ORDER — METFORMIN HYDROCHLORIDE 500 MG/1
500 TABLET, EXTENDED RELEASE ORAL
Qty: 30 TAB | Refills: 0 | Status: SHIPPED | OUTPATIENT
Start: 2019-01-16 | End: 2019-02-07 | Stop reason: SDUPTHER

## 2019-01-16 NOTE — TELEPHONE ENCOUNTER
Called patient regarding metformin refills and diabetes recheck, claims he was unaware that he was supposed to return for follow up 6 weeks after initial diagnosis. Advised that he will be given a 30 day supply but if he does not have labs drawn or a follow up appointment I will not longer refill his medications.

## 2019-01-22 DIAGNOSIS — E11.9 CONTROLLED TYPE 2 DIABETES MELLITUS WITHOUT COMPLICATION, WITHOUT LONG-TERM CURRENT USE OF INSULIN (HCC): ICD-10-CM

## 2019-01-22 RX ORDER — LISINOPRIL 2.5 MG/1
TABLET ORAL
Qty: 30 TAB | Refills: 0 | Status: SHIPPED | OUTPATIENT
Start: 2019-01-22 | End: 2019-03-01 | Stop reason: SDUPTHER

## 2019-02-04 ENCOUNTER — HOSPITAL ENCOUNTER (OUTPATIENT)
Dept: LAB | Facility: MEDICAL CENTER | Age: 49
End: 2019-02-04
Attending: PHYSICIAN ASSISTANT
Payer: COMMERCIAL

## 2019-02-04 LAB
ALBUMIN SERPL BCP-MCNC: 4.5 G/DL (ref 3.2–4.9)
ALBUMIN/GLOB SERPL: 1.7 G/DL
ALP SERPL-CCNC: 44 U/L (ref 30–99)
ALT SERPL-CCNC: 23 U/L (ref 2–50)
ANION GAP SERPL CALC-SCNC: 5 MMOL/L (ref 0–11.9)
AST SERPL-CCNC: 19 U/L (ref 12–45)
BILIRUB SERPL-MCNC: 0.6 MG/DL (ref 0.1–1.5)
BUN SERPL-MCNC: 20 MG/DL (ref 8–22)
CALCIUM SERPL-MCNC: 9.4 MG/DL (ref 8.5–10.5)
CHLORIDE SERPL-SCNC: 106 MMOL/L (ref 96–112)
CHOLEST SERPL-MCNC: 199 MG/DL (ref 100–199)
CO2 SERPL-SCNC: 26 MMOL/L (ref 20–33)
CREAT SERPL-MCNC: 0.98 MG/DL (ref 0.5–1.4)
EST. AVERAGE GLUCOSE BLD GHB EST-MCNC: 157 MG/DL
FASTING STATUS PATIENT QL REPORTED: NORMAL
GLOBULIN SER CALC-MCNC: 2.6 G/DL (ref 1.9–3.5)
GLUCOSE SERPL-MCNC: 180 MG/DL (ref 65–99)
HBA1C MFR BLD: 7.1 % (ref 0–5.6)
HDLC SERPL-MCNC: 44 MG/DL
LDLC SERPL CALC-MCNC: 129 MG/DL
POTASSIUM SERPL-SCNC: 4.1 MMOL/L (ref 3.6–5.5)
PROT SERPL-MCNC: 7.1 G/DL (ref 6–8.2)
SODIUM SERPL-SCNC: 137 MMOL/L (ref 135–145)
TRIGL SERPL-MCNC: 131 MG/DL (ref 0–149)

## 2019-02-04 PROCEDURE — 36415 COLL VENOUS BLD VENIPUNCTURE: CPT

## 2019-02-04 PROCEDURE — 83036 HEMOGLOBIN GLYCOSYLATED A1C: CPT

## 2019-02-04 PROCEDURE — 80053 COMPREHEN METABOLIC PANEL: CPT

## 2019-02-04 PROCEDURE — 80061 LIPID PANEL: CPT

## 2019-02-07 ENCOUNTER — TELEPHONE (OUTPATIENT)
Dept: MEDICAL GROUP | Facility: CLINIC | Age: 49
End: 2019-02-07

## 2019-02-07 ENCOUNTER — OFFICE VISIT (OUTPATIENT)
Dept: MEDICAL GROUP | Facility: CLINIC | Age: 49
End: 2019-02-07
Payer: COMMERCIAL

## 2019-02-07 VITALS
HEART RATE: 80 BPM | WEIGHT: 208 LBS | BODY MASS INDEX: 27.57 KG/M2 | OXYGEN SATURATION: 96 % | DIASTOLIC BLOOD PRESSURE: 88 MMHG | HEIGHT: 73 IN | TEMPERATURE: 99.7 F | SYSTOLIC BLOOD PRESSURE: 134 MMHG | RESPIRATION RATE: 16 BRPM

## 2019-02-07 DIAGNOSIS — E13.9 DIABETES MELLITUS TYPE 1.5, MANAGED AS TYPE 2 (HCC): ICD-10-CM

## 2019-02-07 DIAGNOSIS — E78.2 MIXED HYPERLIPIDEMIA: ICD-10-CM

## 2019-02-07 PROBLEM — M79.674 TOE PAIN, RIGHT: Status: RESOLVED | Noted: 2018-03-08 | Resolved: 2019-02-07

## 2019-02-07 PROCEDURE — 99213 OFFICE O/P EST LOW 20 MIN: CPT | Performed by: PHYSICIAN ASSISTANT

## 2019-02-07 RX ORDER — METFORMIN HYDROCHLORIDE 500 MG/1
500 TABLET, EXTENDED RELEASE ORAL
Qty: 90 TAB | Refills: 0 | Status: SHIPPED | OUTPATIENT
Start: 2019-02-07 | End: 2019-05-09 | Stop reason: SDUPTHER

## 2019-02-07 RX ORDER — PRAVASTATIN SODIUM 20 MG
20 TABLET ORAL
Refills: 11 | COMMUNITY
Start: 2019-01-21 | End: 2019-04-03

## 2019-02-07 ASSESSMENT — PATIENT HEALTH QUESTIONNAIRE - PHQ9: CLINICAL INTERPRETATION OF PHQ2 SCORE: 0

## 2019-02-07 NOTE — ASSESSMENT & PLAN NOTE
Last A1c: 7.1% 2/4/19 decreased from 7.4% Early March, 2018   DM Medications: Metformin  QAM  HTN: Blood pressure goal <140/<90 Yes  ACE: lisinopril 2.5mg daily.   Hyperlipidemia: Cholesterol goal LDL <100 No.   Currently Rx Statin:  Pravastatin 20mg per evening.   Diabetic diet: No, referred to nutrition therapy, not following diet.   Exercise: occasional  Last monofilament foot exam: none .  Checks feet at home: No, no sores currently   Last Eye exam: none recently, will check at next visit.   Kidney function: GFR > 60 Feb 2019  Microalbumin screening: wnl march 2018  Has patient received flu vaccine: No  Has patient received Hep B series:No    A1c goal <7 No  Current barriers to control include poor diet.   Glucose monitoring frequency: none at this time.  Diabetic complications: none    The patient is not taking ASA every day and  Is taking all other medications as prescribed without adverse side effects.

## 2019-02-07 NOTE — TELEPHONE ENCOUNTER
----- Message from Fatmata Godinez P.A.-C. sent at 2/5/2019 10:03 AM PST -----  I reviewed labs. A1C remains elevated, cholesterol is still slightly elevated. Return for follow up as scheduled.

## 2019-02-07 NOTE — ASSESSMENT & PLAN NOTE
April 2018  Cholesterol,Tot   308    Triglycerides   371    HDL   46    LDL   199      Component      Latest Ref Rng & Units 2/4/2019           8:28 AM   Cholesterol,Tot      100 - 199 mg/dL 199   Triglycerides      0 - 149 mg/dL 131   HDL      >=40 mg/dL 44   LDL      <100 mg/dL 129 (H)   Labs as above. Improved with pravastatin 20mg per evening, no diet improvements have been made. Denies chest pain, blurred vision or shortness of breath.

## 2019-02-07 NOTE — PROGRESS NOTES
Chief Complaint   Patient presents with   • Diabetes       HISTORY OF PRESENT ILLNESS: Patient is a 48 y.o. male established patient who presents today for evaluation and management of:    Diabetes mellitus type 1.5, managed as type 2 ([facility])  Last A1c: 7.1% 2/4/19 decreased from 7.4% Early March, 2018   DM Medications: Metformin  QAM  HTN: Blood pressure goal <140/<90 Yes  ACE: lisinopril 2.5mg daily.   Hyperlipidemia: Cholesterol goal LDL <100 No.   Currently Rx Statin:  Pravastatin 20mg per evening.   Diabetic diet: No, referred to nutrition therapy, not following diet.   Exercise: occasional  Last monofilament foot exam: none .  Checks feet at home: No, no sores currently   Last Eye exam: none recently, will check at next visit.   Kidney function: GFR > 60 Feb 2019  Microalbumin screening: wnl march 2018  Has patient received flu vaccine: No  Has patient received Hep B series:No    A1c goal <7 No  Current barriers to control include poor diet.   Glucose monitoring frequency: none at this time.  Diabetic complications: none    The patient is not taking ASA every day and  Is taking all other medications as prescribed without adverse side effects.       Mixed hyperlipidemia  April 2018  Cholesterol,Tot   308    Triglycerides   371    HDL   46    LDL   199      Component      Latest Ref Rng & Units 2/4/2019           8:28 AM   Cholesterol,Tot      100 - 199 mg/dL 199   Triglycerides      0 - 149 mg/dL 131   HDL      >=40 mg/dL 44   LDL      <100 mg/dL 129 (H)   Labs as above. Improved with pravastatin 20mg per evening, no diet improvements have been made. Denies chest pain, blurred vision or shortness of breath.          Patient Active Problem List    Diagnosis Date Noted   • Vitamin D deficiency 12/15/2017   • Diabetes mellitus type 1.5, managed as type 2 ([facility]) 12/15/2017   • Mixed hyperlipidemia 12/15/2017       Allergies:Patient has no known allergies.    Current Outpatient Prescriptions   Medication Sig  Dispense Refill   • pravastatin (PRAVACHOL) 20 MG Tab Take 20 mg by mouth every bedtime.  11   • metFORMIN ER (GLUCOPHAGE XR) 500 MG TABLET SR 24 HR Take 1 Tab by mouth every morning with breakfast. 90 Tab 0   • lisinopril (PRINIVIL) 2.5 MG Tab TAKE 1 TABLET BY MOUTH EVERY DAY 30 Tab 0   • cholecalciferol ([employer] VIT D 5000 HIGH-POTENCY) 5000 UNIT Cap Take 1 Cap by mouth every day. 30 Cap 3   • pravastatin (PRAVACHOL) 40 MG tablet Take 1 Tab by mouth every bedtime. 30 Tab 0     No current facility-administered medications for this visit.        Social History   Substance Use Topics   • Smoking status: Former Smoker     Types: Cigarettes     Quit date: 3/23/2011   • Smokeless tobacco: Former User     Types: Chew   • Alcohol use Yes      Comment: occasionally       Family Status   Relation Status   • Mo    • Fa Alive     Family History   Problem Relation Age of Onset   • Breast Cancer Mother        Review of Systems: See HPI above.   Constitutional: Negative for fever, chills, weight loss and malaise.   HENT: Negative for ear pain, nosebleeds, congestion, sore throat and neck pain.    Eyes: Negative for blurred vision.   Respiratory: Negative for shortness of breath, cough, sputum production and wheezing.    Cardiovascular: Negative for chest pain, palpitations, orthopnea and leg swelling.   Gastrointestinal: Negative for heartburn, nausea, vomiting and abdominal pain.   Genitourinary: Negative for dysuria, urgency and frequency.   Musculoskeletal: Negative for myalgias, back pain and joint pain.   Skin: Negative for rash and itching.   Neurological: Negative for dizziness, tingling, tremors, sensory change, focal weakness and headaches.   Endo/Heme/Allergies: Does not bruise/bleed easily.   Psychiatric/Behavioral: Negative for depression, suicidal ideas and memory loss.  The patient is not nervous/anxious and does not have insomnia.      Exam:  Blood pressure 134/88, pulse 80, temperature 37.6 °C (99.7 °F),  "temperature source Temporal, resp. rate 16, height 1.854 m (6' 1\"), weight 94.3 kg (208 lb), SpO2 96 %.  Body mass index is 27.44 kg/m².  General:  Overweight male in NAD  Head: is grossly normal.  Neck: Supple without masses. Thyroid is not visibly enlarged.  Pulmonary:  Normal effort.  Cardiovascular:  Carotid pulses are intact and equal bilaterally.  Extremities: no clubbing, cyanosis, or edema.    Medical decision-making and discussion:  1. Diabetes mellitus type 1.5, managed as type 2 (HCC)  Unsure if diabetes is type 2, type 1.5 or type 1 at this time, due for recheck. Diet discussed today. Advised to either increase medication or improve diet and patient does not wish to change medication.   - C-PEPTIDE; Future  - metFORMIN ER (GLUCOPHAGE XR) 500 MG TABLET SR 24 HR; Take 1 Tab by mouth every morning with breakfast.  Dispense: 90 Tab; Refill: 0    2. Mixed hyperlipidemia  Continue pravastatin. Improve diet.        Please note that this dictation was created using voice recognition software. I have made every reasonable attempt to correct obvious errors, but I expect that there are errors of grammar and possibly content that I did not discover before finalizing the note.      Return in about 4 weeks (around 3/7/2019) for diabetes.  "

## 2019-10-30 DIAGNOSIS — E78.2 MIXED HYPERLIPIDEMIA: ICD-10-CM

## 2019-10-30 RX ORDER — PRAVASTATIN SODIUM 20 MG
20 TABLET ORAL
Qty: 90 TAB | Refills: 0 | OUTPATIENT
Start: 2019-10-30

## 2019-10-30 NOTE — TELEPHONE ENCOUNTER
Was the patient seen in the last year in this department? Yes    Does patient have an active prescription for medications requested? Yes    Received Request Via: Pharmacy     Last visit  2-7-19    Last lab   2-4-19